# Patient Record
Sex: FEMALE | Race: BLACK OR AFRICAN AMERICAN | NOT HISPANIC OR LATINO | Employment: FULL TIME | URBAN - METROPOLITAN AREA
[De-identification: names, ages, dates, MRNs, and addresses within clinical notes are randomized per-mention and may not be internally consistent; named-entity substitution may affect disease eponyms.]

---

## 2023-08-09 ENCOUNTER — APPOINTMENT (OUTPATIENT)
Dept: PRIMARY CARE | Facility: CLINIC | Age: 43
End: 2023-08-09
Payer: COMMERCIAL

## 2023-09-28 DIAGNOSIS — I10 ESSENTIAL (PRIMARY) HYPERTENSION: ICD-10-CM

## 2023-09-28 RX ORDER — AMLODIPINE BESYLATE 10 MG/1
10 TABLET ORAL DAILY
Qty: 90 TABLET | Refills: 1 | Status: SHIPPED | OUTPATIENT
Start: 2023-09-28 | End: 2024-04-06

## 2023-10-22 DIAGNOSIS — B00.9 HSV (HERPES SIMPLEX VIRUS) INFECTION: Primary | ICD-10-CM

## 2023-10-22 RX ORDER — BUPROPION HYDROCHLORIDE 150 MG/1
TABLET, EXTENDED RELEASE ORAL
Qty: 30 TABLET | OUTPATIENT
Start: 2023-10-22

## 2023-10-22 RX ORDER — ACYCLOVIR 800 MG/1
800 TABLET ORAL 2 TIMES DAILY
Qty: 10 TABLET | Refills: 2 | Status: SHIPPED | OUTPATIENT
Start: 2023-10-22

## 2024-04-06 DIAGNOSIS — I10 ESSENTIAL (PRIMARY) HYPERTENSION: ICD-10-CM

## 2024-04-06 RX ORDER — AMLODIPINE BESYLATE 10 MG/1
10 TABLET ORAL DAILY
Qty: 30 TABLET | Refills: 0 | Status: SHIPPED | OUTPATIENT
Start: 2024-04-06

## 2024-06-26 DIAGNOSIS — I10 ESSENTIAL (PRIMARY) HYPERTENSION: ICD-10-CM

## 2024-06-26 RX ORDER — AMLODIPINE BESYLATE 10 MG/1
10 TABLET ORAL DAILY
Qty: 30 TABLET | Refills: 0 | Status: SHIPPED | OUTPATIENT
Start: 2024-06-26

## 2024-07-16 ENCOUNTER — APPOINTMENT (OUTPATIENT)
Dept: PRIMARY CARE | Facility: CLINIC | Age: 44
End: 2024-07-16
Payer: COMMERCIAL

## 2024-07-16 ENCOUNTER — LAB (OUTPATIENT)
Dept: LAB | Facility: LAB | Age: 44
End: 2024-07-16
Payer: COMMERCIAL

## 2024-07-16 VITALS
SYSTOLIC BLOOD PRESSURE: 123 MMHG | HEART RATE: 82 BPM | BODY MASS INDEX: 29.02 KG/M2 | OXYGEN SATURATION: 98 % | HEIGHT: 64 IN | DIASTOLIC BLOOD PRESSURE: 78 MMHG | WEIGHT: 170 LBS

## 2024-07-16 DIAGNOSIS — F39 MOOD DISORDER (CMS-HCC): ICD-10-CM

## 2024-07-16 DIAGNOSIS — Z00.00 HEALTH MAINTENANCE EXAMINATION: Primary | ICD-10-CM

## 2024-07-16 DIAGNOSIS — E05.90 SUBCLINICAL HYPERTHYROIDISM: Primary | ICD-10-CM

## 2024-07-16 DIAGNOSIS — I10 ESSENTIAL (PRIMARY) HYPERTENSION: ICD-10-CM

## 2024-07-16 DIAGNOSIS — Z12.4 CERVICAL CANCER SCREENING: ICD-10-CM

## 2024-07-16 DIAGNOSIS — B00.9 HSV (HERPES SIMPLEX VIRUS) INFECTION: ICD-10-CM

## 2024-07-16 DIAGNOSIS — R01.1 CARDIAC MURMUR: ICD-10-CM

## 2024-07-16 DIAGNOSIS — Z13.0 SCREENING FOR DEFICIENCY ANEMIA: ICD-10-CM

## 2024-07-16 DIAGNOSIS — Z13.220 LIPID SCREENING: ICD-10-CM

## 2024-07-16 DIAGNOSIS — R53.83 OTHER FATIGUE: ICD-10-CM

## 2024-07-16 DIAGNOSIS — Z12.31 ENCOUNTER FOR SCREENING MAMMOGRAM FOR MALIGNANT NEOPLASM OF BREAST: ICD-10-CM

## 2024-07-16 DIAGNOSIS — Z23 IMMUNIZATION DUE: ICD-10-CM

## 2024-07-16 LAB
25(OH)D3 SERPL-MCNC: 16 NG/ML (ref 30–100)
ALBUMIN SERPL BCP-MCNC: 4.8 G/DL (ref 3.4–5)
ALP SERPL-CCNC: 70 U/L (ref 33–110)
ALT SERPL W P-5'-P-CCNC: 12 U/L (ref 7–45)
ANION GAP SERPL CALC-SCNC: 14 MMOL/L (ref 10–20)
AST SERPL W P-5'-P-CCNC: 15 U/L (ref 9–39)
BILIRUB SERPL-MCNC: 0.8 MG/DL (ref 0–1.2)
BUN SERPL-MCNC: 7 MG/DL (ref 6–23)
CALCIUM SERPL-MCNC: 10.2 MG/DL (ref 8.6–10.6)
CHLORIDE SERPL-SCNC: 104 MMOL/L (ref 98–107)
CHOLEST SERPL-MCNC: 163 MG/DL (ref 0–199)
CHOLESTEROL/HDL RATIO: 3.3
CO2 SERPL-SCNC: 27 MMOL/L (ref 21–32)
CREAT SERPL-MCNC: 0.72 MG/DL (ref 0.5–1.05)
EGFRCR SERPLBLD CKD-EPI 2021: >90 ML/MIN/1.73M*2
ERYTHROCYTE [DISTWIDTH] IN BLOOD BY AUTOMATED COUNT: 12.6 % (ref 11.5–14.5)
GLUCOSE SERPL-MCNC: 83 MG/DL (ref 74–99)
HCT VFR BLD AUTO: 43.5 % (ref 36–46)
HDLC SERPL-MCNC: 48.7 MG/DL
HGB BLD-MCNC: 14.2 G/DL (ref 12–16)
LDLC SERPL CALC-MCNC: 97 MG/DL
MCH RBC QN AUTO: 29.8 PG (ref 26–34)
MCHC RBC AUTO-ENTMCNC: 32.6 G/DL (ref 32–36)
MCV RBC AUTO: 91 FL (ref 80–100)
NON HDL CHOLESTEROL: 114 MG/DL (ref 0–149)
NRBC BLD-RTO: 0 /100 WBCS (ref 0–0)
PLATELET # BLD AUTO: 257 X10*3/UL (ref 150–450)
POTASSIUM SERPL-SCNC: 4.4 MMOL/L (ref 3.5–5.3)
PROT SERPL-MCNC: 8.4 G/DL (ref 6.4–8.2)
RBC # BLD AUTO: 4.76 X10*6/UL (ref 4–5.2)
SODIUM SERPL-SCNC: 141 MMOL/L (ref 136–145)
T3 SERPL-MCNC: 104 NG/DL (ref 60–200)
T3FREE SERPL-MCNC: 2.9 PG/ML (ref 2.3–4.2)
T4 FREE SERPL-MCNC: 1.12 NG/DL (ref 0.78–1.48)
TRIGL SERPL-MCNC: 87 MG/DL (ref 0–149)
TSH SERPL-ACNC: 0.28 MIU/L (ref 0.44–3.98)
VLDL: 17 MG/DL (ref 0–40)
WBC # BLD AUTO: 5.9 X10*3/UL (ref 4.4–11.3)

## 2024-07-16 PROCEDURE — 3008F BODY MASS INDEX DOCD: CPT | Performed by: STUDENT IN AN ORGANIZED HEALTH CARE EDUCATION/TRAINING PROGRAM

## 2024-07-16 PROCEDURE — 84443 ASSAY THYROID STIM HORMONE: CPT

## 2024-07-16 PROCEDURE — 80053 COMPREHEN METABOLIC PANEL: CPT

## 2024-07-16 PROCEDURE — 3074F SYST BP LT 130 MM HG: CPT | Performed by: STUDENT IN AN ORGANIZED HEALTH CARE EDUCATION/TRAINING PROGRAM

## 2024-07-16 PROCEDURE — 90471 IMMUNIZATION ADMIN: CPT | Performed by: STUDENT IN AN ORGANIZED HEALTH CARE EDUCATION/TRAINING PROGRAM

## 2024-07-16 PROCEDURE — 90677 PCV20 VACCINE IM: CPT | Performed by: STUDENT IN AN ORGANIZED HEALTH CARE EDUCATION/TRAINING PROGRAM

## 2024-07-16 PROCEDURE — 82306 VITAMIN D 25 HYDROXY: CPT

## 2024-07-16 PROCEDURE — 84481 FREE ASSAY (FT-3): CPT

## 2024-07-16 PROCEDURE — 84439 ASSAY OF FREE THYROXINE: CPT

## 2024-07-16 PROCEDURE — 36415 COLL VENOUS BLD VENIPUNCTURE: CPT

## 2024-07-16 PROCEDURE — 99396 PREV VISIT EST AGE 40-64: CPT | Performed by: STUDENT IN AN ORGANIZED HEALTH CARE EDUCATION/TRAINING PROGRAM

## 2024-07-16 PROCEDURE — 80061 LIPID PANEL: CPT

## 2024-07-16 PROCEDURE — 85027 COMPLETE CBC AUTOMATED: CPT

## 2024-07-16 PROCEDURE — 3078F DIAST BP <80 MM HG: CPT | Performed by: STUDENT IN AN ORGANIZED HEALTH CARE EDUCATION/TRAINING PROGRAM

## 2024-07-16 PROCEDURE — 84480 ASSAY TRIIODOTHYRONINE (T3): CPT

## 2024-07-16 RX ORDER — AMLODIPINE BESYLATE 10 MG/1
10 TABLET ORAL DAILY
Qty: 90 TABLET | Refills: 3 | Status: SHIPPED | OUTPATIENT
Start: 2024-07-16

## 2024-07-16 RX ORDER — VALACYCLOVIR HYDROCHLORIDE 500 MG/1
500 TABLET, FILM COATED ORAL DAILY
Qty: 90 TABLET | Refills: 3 | Status: SHIPPED | OUTPATIENT
Start: 2024-07-16 | End: 2025-07-11

## 2024-07-16 ASSESSMENT — PROMIS GLOBAL HEALTH SCALE
RATE_PHYSICAL_HEALTH: POOR
CARRYOUT_SOCIAL_ACTIVITIES: POOR
EMOTIONAL_PROBLEMS: ALWAYS
RATE_SOCIAL_SATISFACTION: POOR
RATE_QUALITY_OF_LIFE: GOOD
RATE_AVERAGE_FATIGUE: SEVERE
CARRYOUT_PHYSICAL_ACTIVITIES: MODERATELY
RATE_GENERAL_HEALTH: POOR
RATE_AVERAGE_PAIN: 5
RATE_MENTAL_HEALTH: POOR

## 2024-07-16 NOTE — PATIENT INSTRUCTIONS
Thank you for coming in today!    Labs in [address]    Please stop in [address] to schedule your Mammogram and Echocardiogram     Josie Arteaga referral for Behavioral Health   [phone]    Continue current blood pressure medication     Prevnar 20 vaccination today in office    Follow up with me in 3-4 months to check in!    Best,  Dr. PHAM

## 2024-07-16 NOTE — PROGRESS NOTES
Nettie Wheatley is a 43 y.o. female seen in Clinic at [facility] by [place]. Cali San on 07/16/24 for routine care, as well as for management of the following chronic medical conditions: cardiac murmur (echo ordered, not completed), HSV2, COVID infection (2022), elevated BP, anxiety. She presents today with acute concern of no energy and for CPE.      ACUTE CONCERNS  - Quit cigar smoking however started vaping; 1-2 cartridges a week; interested in quitting and trial nicorette gum however was not successful   - Ongoing anxiety with mostly ruminating thoughts and perseverating on things in her life; unable to get into psychology; will refer to behavioral health   - Fatigue: daytime fatigue, eating less meat with goal for more of a vegan diet however was noticing decreased energy prior to this diet change; sleeps from 7-11 PM and then 1 AM to 6:15 AM which she attributes to standing a lot at work; menstrual periods are 3-4 days and regular with 3 pads to 4-5 pads/tampons used in a day; no snoring or awakening from sleep gasping    UPDATES  - CMP, CBC, lipid panel, TSH for fatigue  - pap  today and mammogram ordered  - Echo for systolic murmur  - Pnueumovax offered today  - Behavioral Health Referral   - Daily valacyclovir for frequent outbreaks    CHRONIC MEDICAL CONDITIONS:   #HTN  - BP elevated in ED at time of COVID diagnosis and at time of last in person visit   - 123/78 in clinic today; does not have blood pressure monitor at home  - never had labs completed as previously recommended though RFP from 07/2022 reassuring  - Misses amlodipine 1-2 times a week  - prior EKG: NSR, reassuring   [ ] labs today   [ ] echo given murmur  [ ] Amlodipine 10mg daily      #Tobacco Use, Vaping  - quit with nicotine replacement and Wellbutrin in Fall 2022 after last visit however currently vaping 1-2 cartridges a week  - Counseled on smoking cessation and weaning slowly; declined NRT at this time as not previously helpful      #Anxiety, specific phobia  - Ruminating thoughts; difficulty regulating emotions; no SI/HI   - Psychology referral at last visit; unable to get an appointment     #Cardiac Murmur  - Systolic murmur on exam at time of last in person evaluation   - No echo in [facility] available for review  - Cardiac procedure in childhood for patient  - Echo ordered at last visit but never completed   - No red flag signs/symptoms on cardiac history; no CP, lightheadedness, syncope, pre-syncope; does have easy fatigue and some dyspnea with prolonged exertion, however   [ ] echo today      #HSV2  - Acycolvir prescribed for use with outbreaks; 5-6 outbreaks this year and often in stressful settings   - Will transition to daily valacyclovir for suppression to minimize outbreaks       Past Medical History: as above   Past Medical History:   Diagnosis Date    Other specified health status     No known health problems     Subspecialty Medical Care: none    Past Surgical History: none  Past Surgical History:   Procedure Laterality Date    OTHER SURGICAL HISTORY  12/05/2017    History Of Prior Surgery     Medications:  Current Outpatient Medications:     amLODIPine (Norvasc) 10 mg tablet, Take 1 tablet (10 mg) by mouth once daily. NEEDS IN PERSON VISIT FOR ANY ADDITIONAL REFILLS., Disp: 30 tablet, Rfl: 0  Pharmacy:     Allergies: none  Not on File    Immunizations:   - recommend flu annually  - recommend staying UTD with COVID boosters  - Tdap 3/26/2018  - PCV-20 today     Family History:   - T2DM (mother), HTN (mother)   No family history on file.    Social History:   Home/Living Situation/Falls/Safety Assessment: lives home with daughter; 12th grade daughter   Education/Employment/Work/Vocational: works for [employer]   Activities: working, spending time with daughter  Drug Use: smokes black and milds (1-1.5 boxes/day); 20 year history --> QUIT in Fall 2022; has resumed vaping as above   Diet: recently has stopped eating meat (or at least  "limited)   Depression/Anxiety: anxiety has become a big issue; some specific phobias (i.e. heights); tearful in office today; difficulty coping with work/parenting/etc; interested in counseling referral, provided today   Sexuality/Contraception/Menstrual History: STI screening today with PAP   Sleep:poor sleep habits often related to difficult work schedule     Visit Vitals  /78   Pulse 82   Ht 1.626 m (5' 4\")   Wt 77.1 kg (170 lb)   SpO2 98%   BMI 29.18 kg/m²   Smoking Status Never   BSA 1.87 m²      PHYSICAL EXAM:   General: well appearing AA female, NAD, pleasant and engaged in encounter    HEENT: NCAT, MMM  CV: RRR, systolic murmur 3/6   PULM: CTAB, non-labored respirations   ABD: soft, NT, ND, + bowel sounds   : no suprapubic or CVA tenderness, no herpetiform lesions on labia  EXT: WWP, no significant edema   SKIN: no rashes noted   NEURO: A&Ox4, symmetric facies, no gross motor or sensory deficits, normal gait  PSYCH: pleasant mood, appropriate affect     Assessment/Plan    Nettie Wheatley is a 43 y.o. female [place] [facility] [place] Cali San on 07/16/24 for routine care, as well as for management of the following chronic medical conditions: cardiac murmur (echo ordered, not completed), HSV2, COVID infection (2022), elevated BP, anxiety. She presents today with acute concern of no energy and for CPE.      ACUTE CONCERNS  - Quit cigar smoking however started vaping; 1-2 cartridges a week; interested in quitting and trial nicorette gum however was not successful   - Ongoing anxiety with mostly ruminating thoughts and perseverating on things in her life; unable to get into psychology; will refer to behavioral health   - Fatigue: daytime fatigue, eating less meat with goal for more of a vegan diet however was noticing decreased energy prior to this diet change; sleeps from 7-11 PM and then 1 AM to 6:15 AM which she attributes to standing a lot at work; menstrual periods are 3-4 days and " regular with 3 pads to 4-5 pads/tampons used in a day; no snoring or awakening from sleep gasping    UPDATES  - CMP, CBC, lipid panel, TSH for fatigue  - pap  today and mammogram ordered  - Echo for systolic murmur  - Pnueumovax offered today  - Behavioral Health Referral   - Daily valacyclovir for frequent outbreaks    CHRONIC MEDICAL CONDITIONS:   #HTN  - BP elevated in ED at time of COVID diagnosis and at time of last in person visit   - 123/78 in clinic today; does not have blood pressure monitor at home  - never had labs completed as previously recommended though RFP from 07/2022 reassuring  - Misses amlodipine 1-2 times a week  - prior EKG: NSR, reassuring   [ ] labs today   [ ] echo given murmur  [ ] Amlodipine 10mg daily      #Tobacco Use, Vaping  - quit with nicotine replacement and Wellbutrin in Fall 2022 after last visit however currently vaping 1-2 cartridges a week  - Counseled on smoking cessation and weaning slowly; declined NRT at this time as not previously helpful     #Anxiety, specific phobia  - Ruminating thoughts; difficulty regulating emotions; no SI/HI   - Psychology referral at last visit; unable to get an appointment     #Cardiac Murmur  - Systolic murmur on exam at time of last in person evaluation   - No echo in [facility] available for review  - Cardiac procedure in childhood for patient  - Echo ordered at last visit but never completed   - No red flag signs/symptoms on cardiac history; no CP, lightheadedness, syncope, pre-syncope; does have easy fatigue and some dyspnea with prolonged exertion, however   [ ] echo today      #HSV2  - Acycolvir prescribed for use with outbreaks; 5-6 outbreaks this year and often in stressful settings   - Will transition to daily valacyclovir for suppression to minimize outbreaks      #Health Maintenance  Cancer Screening  - Cervical Cancer Screening: last pap smear/HPV testing: completed today  - Mammography: mammogram ordered  - Colorectal Cancer  Screening: no family hx colon cancer; screening to start at 45      Laboratory Screening:   - Lipid Screen: , , HDL 46.9, TG 67; repeat today   - ASCVD Score: 3.6% on 2023 labs; will reorder today  - A1C, glucose screen: repeat labs today   - STI, HIV, Hep B screen: HepB and HIV nonreactive (2/2023)  - Hep C screen: nonreactive (2/2023)     Imaging Screening  - AAA screening: NA  - Osteoporosis/DEXA screening: at 65     Immunizations  - Influenza: recommended; agreeable this year  - COVID: booster recommended  - Tdap: 3/2018; next due 2028  - Prevnar, Pneumovax: recommended now iso smoking; given today   - Shingrix: at age 50     Other Screening  - Health Literacy Assessment: appropriate   - Depression screen: anxiety as above  - Alcohol/tobacco/drug use screen: vaping as above   - Alcohol: 1 drink daily bessy  - Drug: none     Referrals: labs, behavioral health, mammogram, echo, pap, PCV-20     RTC in 3-4 months     Patient Discussion:    Please call back the office with any questions at [phone]. In the case of an emergency, please call [phone] or go to the nearest Emergency Department.      Cali San MD  Internal Medicine-Pediatrics  [facility] [address], [address]  P: [phone], F: [phone]

## 2024-07-17 PROCEDURE — 87591 N.GONORRHOEAE DNA AMP PROB: CPT

## 2024-07-17 PROCEDURE — 87661 TRICHOMONAS VAGINALIS AMPLIF: CPT

## 2024-07-17 PROCEDURE — 87491 CHLMYD TRACH DNA AMP PROBE: CPT

## 2024-07-18 LAB
C TRACH RRNA SPEC QL NAA+PROBE: NEGATIVE
N GONORRHOEA DNA SPEC QL PROBE+SIG AMP: NEGATIVE
T VAGINALIS RRNA SPEC QL NAA+PROBE: NEGATIVE

## 2024-07-23 ENCOUNTER — APPOINTMENT (OUTPATIENT)
Dept: PRIMARY CARE | Facility: CLINIC | Age: 44
End: 2024-07-23
Payer: COMMERCIAL

## 2024-07-23 DIAGNOSIS — F39 MOOD DISORDER (CMS-HCC): ICD-10-CM

## 2024-07-23 ASSESSMENT — ANXIETY QUESTIONNAIRES
IF YOU CHECKED OFF ANY PROBLEMS ON THIS QUESTIONNAIRE, HOW DIFFICULT HAVE THESE PROBLEMS MADE IT FOR YOU TO DO YOUR WORK, TAKE CARE OF THINGS AT HOME, OR GET ALONG WITH OTHER PEOPLE: SOMEWHAT DIFFICULT
5. BEING SO RESTLESS THAT IT IS HARD TO SIT STILL: MORE THAN HALF THE DAYS
1. FEELING NERVOUS, ANXIOUS, OR ON EDGE: NEARLY EVERY DAY
2. NOT BEING ABLE TO STOP OR CONTROL WORRYING: NEARLY EVERY DAY
6. BECOMING EASILY ANNOYED OR IRRITABLE: NEARLY EVERY DAY
4. TROUBLE RELAXING: NEARLY EVERY DAY
3. WORRYING TOO MUCH ABOUT DIFFERENT THINGS: NEARLY EVERY DAY
GAD7 TOTAL SCORE: 19
7. FEELING AFRAID AS IF SOMETHING AWFUL MIGHT HAPPEN: MORE THAN HALF THE DAYS

## 2024-07-23 ASSESSMENT — PATIENT HEALTH QUESTIONNAIRE - PHQ9
3. TROUBLE FALLING OR STAYING ASLEEP: NEARLY EVERY DAY
SUM OF ALL RESPONSES TO PHQ9 QUESTIONS 1 & 2: 6
7. TROUBLE CONCENTRATING ON THINGS, SUCH AS READING THE NEWSPAPER OR WATCHING TELEVISION: NEARLY EVERY DAY
10. IF YOU CHECKED OFF ANY PROBLEMS, HOW DIFFICULT HAVE THESE PROBLEMS MADE IT FOR YOU TO DO YOUR WORK, TAKE CARE OF THINGS AT HOME, OR GET ALONG WITH OTHER PEOPLE: VERY DIFFICULT
5. POOR APPETITE OR OVEREATING: NEARLY EVERY DAY
9. THOUGHTS THAT YOU WOULD BE BETTER OFF DEAD, OR OF HURTING YOURSELF: SEVERAL DAYS
2. FEELING DOWN, DEPRESSED OR HOPELESS: NEARLY EVERY DAY
6. FEELING BAD ABOUT YOURSELF - OR THAT YOU ARE A FAILURE OR HAVE LET YOURSELF OR YOUR FAMILY DOWN: NEARLY EVERY DAY
SUM OF ALL RESPONSES TO PHQ QUESTIONS 1-9: 24
4. FEELING TIRED OR HAVING LITTLE ENERGY: NEARLY EVERY DAY
1. LITTLE INTEREST OR PLEASURE IN DOING THINGS: NEARLY EVERY DAY
8. MOVING OR SPEAKING SO SLOWLY THAT OTHER PEOPLE COULD HAVE NOTICED. OR THE OPPOSITE, BEING SO FIGETY OR RESTLESS THAT YOU HAVE BEEN MOVING AROUND A LOT MORE THAN USUAL: MORE THAN HALF THE DAYS
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
## 2024-07-25 LAB
CYTOLOGY CMNT CVX/VAG CYTO-IMP: NORMAL
HPV HR 12 DNA GENITAL QL NAA+PROBE: POSITIVE
HPV HR GENOTYPES PNL CVX NAA+PROBE: POSITIVE
HPV16 DNA SPEC QL NAA+PROBE: NEGATIVE
HPV18 DNA SPEC QL NAA+PROBE: NEGATIVE
LAB AP HPV GENOTYPE QUESTION: YES
LAB AP HPV HR: NORMAL
LAB AP PAP ADDITIONAL TESTS: NORMAL
LABORATORY COMMENT REPORT: NORMAL
LABORATORY COMMENT REPORT: NORMAL
PATH REPORT.TOTAL CANCER: NORMAL

## 2024-07-26 ENCOUNTER — HOSPITAL ENCOUNTER (OUTPATIENT)
Dept: RADIOLOGY | Facility: CLINIC | Age: 44
Discharge: HOME | End: 2024-07-26
Payer: COMMERCIAL

## 2024-07-26 ENCOUNTER — DOCUMENTATION (OUTPATIENT)
Dept: BEHAVIORAL HEALTH | Facility: CLINIC | Age: 44
End: 2024-07-26
Payer: COMMERCIAL

## 2024-07-26 VITALS — WEIGHT: 170 LBS | BODY MASS INDEX: 29.02 KG/M2 | HEIGHT: 64 IN

## 2024-07-26 DIAGNOSIS — Z12.31 ENCOUNTER FOR SCREENING MAMMOGRAM FOR MALIGNANT NEOPLASM OF BREAST: ICD-10-CM

## 2024-07-26 PROCEDURE — 77067 SCR MAMMO BI INCL CAD: CPT
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
## 2024-07-29 ENCOUNTER — DOCUMENTATION (OUTPATIENT)
Dept: PRIMARY CARE | Facility: CLINIC | Age: 44
End: 2024-07-29
Payer: COMMERCIAL

## 2024-07-29 DIAGNOSIS — F41.8 MIXED ANXIETY AND DEPRESSIVE DISORDER: Primary | ICD-10-CM

## 2024-08-01 ENCOUNTER — APPOINTMENT (OUTPATIENT)
Dept: PRIMARY CARE | Facility: CLINIC | Age: 44
End: 2024-08-01
Payer: COMMERCIAL

## 2024-08-13 ENCOUNTER — APPOINTMENT (OUTPATIENT)
Dept: PRIMARY CARE | Facility: CLINIC | Age: 44
End: 2024-08-13
Payer: COMMERCIAL

## 2024-08-13 ASSESSMENT — PATIENT HEALTH QUESTIONNAIRE - PHQ9
5. POOR APPETITE OR OVEREATING: NEARLY EVERY DAY
10. IF YOU CHECKED OFF ANY PROBLEMS, HOW DIFFICULT HAVE THESE PROBLEMS MADE IT FOR YOU TO DO YOUR WORK, TAKE CARE OF THINGS AT HOME, OR GET ALONG WITH OTHER PEOPLE: VERY DIFFICULT
4. FEELING TIRED OR HAVING LITTLE ENERGY: NEARLY EVERY DAY
1. LITTLE INTEREST OR PLEASURE IN DOING THINGS: NEARLY EVERY DAY
6. FEELING BAD ABOUT YOURSELF - OR THAT YOU ARE A FAILURE OR HAVE LET YOURSELF OR YOUR FAMILY DOWN: MORE THAN HALF THE DAYS
9. THOUGHTS THAT YOU WOULD BE BETTER OFF DEAD, OR OF HURTING YOURSELF: NOT AT ALL
SUM OF ALL RESPONSES TO PHQ9 QUESTIONS 1 & 2: 5
7. TROUBLE CONCENTRATING ON THINGS, SUCH AS READING THE NEWSPAPER OR WATCHING TELEVISION: NEARLY EVERY DAY
SUM OF ALL RESPONSES TO PHQ QUESTIONS 1-9: 21
2. FEELING DOWN, DEPRESSED OR HOPELESS: MORE THAN HALF THE DAYS
8. MOVING OR SPEAKING SO SLOWLY THAT OTHER PEOPLE COULD HAVE NOTICED. OR THE OPPOSITE, BEING SO FIGETY OR RESTLESS THAT YOU HAVE BEEN MOVING AROUND A LOT MORE THAN USUAL: MORE THAN HALF THE DAYS
3. TROUBLE FALLING OR STAYING ASLEEP: NEARLY EVERY DAY

## 2024-08-13 ASSESSMENT — ANXIETY QUESTIONNAIRES
1. FEELING NERVOUS, ANXIOUS, OR ON EDGE: NEARLY EVERY DAY
GAD7 TOTAL SCORE: 17
IF YOU CHECKED OFF ANY PROBLEMS ON THIS QUESTIONNAIRE, HOW DIFFICULT HAVE THESE PROBLEMS MADE IT FOR YOU TO DO YOUR WORK, TAKE CARE OF THINGS AT HOME, OR GET ALONG WITH OTHER PEOPLE: VERY DIFFICULT
7. FEELING AFRAID AS IF SOMETHING AWFUL MIGHT HAPPEN: MORE THAN HALF THE DAYS
5. BEING SO RESTLESS THAT IT IS HARD TO SIT STILL: NOT AT ALL
3. WORRYING TOO MUCH ABOUT DIFFERENT THINGS: NEARLY EVERY DAY
2. NOT BEING ABLE TO STOP OR CONTROL WORRYING: NEARLY EVERY DAY
6. BECOMING EASILY ANNOYED OR IRRITABLE: NEARLY EVERY DAY
4. TROUBLE RELAXING: NEARLY EVERY DAY

## 2024-08-13 NOTE — PROGRESS NOTES
Collaborative Care ([facility])  Progress Note    Type of Interaction: In Office    Start Time: 310    End Time: 428        Appointment: Scheduled    Reason for Visit:   Chief Complaint   Patient presents with    Depression    Anxiety          Interval History / Patient Symptoms:     Patient Health Questionnaire-9 Score: 21 (8/13/2024  3:16 PM)  ZACK-7 Total Score: 17 (8/13/2024  3:14 PM)        Interventions Provided: Develop Coping Strategies; Anger Management       Response to Intervention:    - has been trying to work on not allowing situations as work effect her all day - has a hard time letting things go-  has been able to confide in a friend at times  - manager says it's the way I say things  - discussed different forms of communication   - pt able to give examples of when communication both non-verbal and verbal were aggressive or passive aggressive  - discussed anger being a secondary emotion and the need to be more away of primary feelings that lead to anger (Anger Iceberg handout)   - discussed energy tokens as a way to assist when patient is being triggered or leading to anger      Plan:   - identify triggers and emotions that lead to anger and ask yourself what's in my control  - practice being assertive vs aggressive      Follow Up / Next Appointment: 8/26

## 2024-08-14 ENCOUNTER — APPOINTMENT (OUTPATIENT)
Dept: CARDIOLOGY | Facility: CLINIC | Age: 44
End: 2024-08-14
Payer: COMMERCIAL

## 2024-08-20 ENCOUNTER — APPOINTMENT (OUTPATIENT)
Dept: CARDIOLOGY | Facility: CLINIC | Age: 44
End: 2024-08-20
Payer: COMMERCIAL

## 2024-08-26 ENCOUNTER — APPOINTMENT (OUTPATIENT)
Dept: PRIMARY CARE | Facility: CLINIC | Age: 44
End: 2024-08-26
Payer: COMMERCIAL

## 2024-08-30 ENCOUNTER — DOCUMENTATION (OUTPATIENT)
Dept: PRIMARY CARE | Facility: CLINIC | Age: 44
End: 2024-08-30
Payer: COMMERCIAL

## 2024-08-30 DIAGNOSIS — F41.8 MIXED ANXIETY AND DEPRESSIVE DISORDER: Primary | ICD-10-CM

## 2024-10-16 ENCOUNTER — APPOINTMENT (OUTPATIENT)
Dept: PRIMARY CARE | Facility: CLINIC | Age: 44
End: 2024-10-16
Payer: COMMERCIAL

## 2024-11-13 ENCOUNTER — APPOINTMENT (OUTPATIENT)
Dept: OBSTETRICS AND GYNECOLOGY | Facility: CLINIC | Age: 44
End: 2024-11-13
Payer: COMMERCIAL

## 2025-07-16 ENCOUNTER — APPOINTMENT (OUTPATIENT)
Dept: PRIMARY CARE | Facility: CLINIC | Age: 45
End: 2025-07-16
Payer: COMMERCIAL

## 2025-07-27 DIAGNOSIS — I10 ESSENTIAL (PRIMARY) HYPERTENSION: ICD-10-CM

## 2025-07-27 DIAGNOSIS — B00.9 HSV (HERPES SIMPLEX VIRUS) INFECTION: ICD-10-CM

## 2025-07-29 RX ORDER — VALACYCLOVIR HYDROCHLORIDE 500 MG/1
500 TABLET, FILM COATED ORAL DAILY
Qty: 90 TABLET | Refills: 0 | Status: SHIPPED | OUTPATIENT
Start: 2025-07-29

## 2025-07-29 RX ORDER — AMLODIPINE BESYLATE 10 MG/1
10 TABLET ORAL DAILY
Qty: 90 TABLET | Refills: 0 | Status: SHIPPED | OUTPATIENT
Start: 2025-07-29